# Patient Record
(demographics unavailable — no encounter records)

---

## 2025-03-26 NOTE — ASSESSMENT
[FreeTextEntry1] : Lisa is a 15 year old girl who has a painless left ganglion cyst via the dorsal aspect of her left hand/wrist. Today's assessment was performed with the assistance of the patient's parent as an independent historian as the patient's history is unreliable. The radiographs obtained today were reviewed with both the parent and patient confirming normal left hand x rays.  The recommendation at this time would be to observation vs surgical removal. The family will contact the office if she would like to go forward with surgery otherwise follow up on a PRN basis.   We had a thorough talk in regard to the diagnosis, prognosis and treatment modalities.  All questions and concerns were addressed today. There was a verbal understanding from the parents and patient.   ELY Marshall have acted as a scribe and documented the above information for Dr. Silva.   This note was generated using Dragon medical dictation software. A reasonable effort has been made for proofreading its contents; however, typos may still remain. If there are any questions or points of clarification needed, please do not hesitate to contact my office.   The above documentation completed by the scribe is an accurate record of both my words and actions.   Dr. Silva.

## 2025-03-26 NOTE — ASSESSMENT
[FreeTextEntry1] : Lisa is a 15 year old girl who has a painless left ganglion cyst via the dorsal aspect of her left hand/wrist. Today's assessment was performed with the assistance of the patient's parent as an independent historian as the patient's history is unreliable. The radiographs obtained today were reviewed with both the parent and patient confirming normal left hand x rays.  The recommendation at this time would be to observation vs surgical removal. The family will contact the office if she would like to go forward with surgery otherwise follow up on a PRN basis.   We had a thorough talk in regard to the diagnosis, prognosis and treatment modalities.  All questions and concerns were addressed today. There was a verbal understanding from the parents and patient.   ELY Marshall have acted as a scribe and documented the above information for Dr. Silva.   This note was generated using Dragon medical dictation software. A reasonable effort has been made for proofreading its contents; however, typos may still remain. If there are any questions or points of clarification needed, please do not hesitate to contact my office.   The above documentation completed by the scribe is an accurate record of both my words and actions.   Dr. Silav.

## 2025-03-26 NOTE — REVIEW OF SYSTEMS
[Change in Activity] : no change in activity [Fever Above 102] : no fever [Itching] : no itching [Redness] : no redness [Sore Throat] : no sore throat [Wheezing] : no wheezing [Vomiting] : no vomiting [Limping] : no limping [Joint Pains] : no arthralgias [Joint Swelling] : no joint swelling

## 2025-03-26 NOTE — REASON FOR VISIT
[Initial Evaluation] : an initial evaluation [Patient] : patient [Mother] : mother [FreeTextEntry1] : Bump noted via the dorsal aspect of the left hand for a year.

## 2025-03-26 NOTE — END OF VISIT
[FreeTextEntry3] : I, Ovidio Silva MD, I personally performed the services described in the documentation, reviewed the documentation recorded by the scribe in my presence and it accurately and completely records my words and actions

## 2025-03-26 NOTE — PHYSICAL EXAM
[FreeTextEntry1] : Pleasant and cooperative with exam, appropriate for age. Ambulates without evidence of antalgia and limp, good coordination and balance. AAOX3  Skin: No rashes noted.  Eyes: Both conjunctiva, eyelids and pupils are present.  ENT:  Both ears, nose and lips are present. No nasal congestion.  Resp: No cough or wheezing noted.  Left Hand: Full active and passive range of motion of the metacarpophalangeal joints, PIP and DIP joints with no deformities noted on observation. There is no bogginess or joint abnormalities. There is no edema, ecchymosis or erythema noted.  No swan neck deformity noted. No boutonnieres deformity noted. Neurologically intact in the radial/ulnar/median nerve distribution, with good muscle strengths 5\5. Good capillary refill +1 in all digits. All joints are stable with stress maneuvers. There is no pain elicited with palpation over the metacarpals or phalanges. + small painless ganglion cyst via the dorsal aspect of hand adjacent to the wrist. On partial clenched fist, the index, long, ring and little finger point to the scaphoid bone.

## 2025-03-26 NOTE — DATA REVIEWED
[de-identified] : Left Hand AP/LAT/OBL x rays 03/25/25 : No fracture noted. Joint spaces appear normal. Growth plates are open. No bony cysts noted.

## 2025-03-26 NOTE — HISTORY OF PRESENT ILLNESS
[FreeTextEntry1] : Lisa is a 15-year-old girl who noticed a small bump forming on the dorsal aspect of her left hand for a year with no history of pain.  She denies any fevers.  This is not painful.  She presents today for pediatric orthopedic consultation.

## 2025-03-26 NOTE — DATA REVIEWED
[de-identified] : Left Hand AP/LAT/OBL x rays 03/25/25 : No fracture noted. Joint spaces appear normal. Growth plates are open. No bony cysts noted.